# Patient Record
Sex: MALE | Race: BLACK OR AFRICAN AMERICAN
[De-identification: names, ages, dates, MRNs, and addresses within clinical notes are randomized per-mention and may not be internally consistent; named-entity substitution may affect disease eponyms.]

---

## 2017-10-09 ENCOUNTER — HOSPITAL ENCOUNTER (EMERGENCY)
Dept: HOSPITAL 92 - ERS | Age: 52
Discharge: HOME | End: 2017-10-09
Payer: COMMERCIAL

## 2017-10-09 DIAGNOSIS — Z79.82: ICD-10-CM

## 2017-10-09 DIAGNOSIS — Z86.73: ICD-10-CM

## 2017-10-09 DIAGNOSIS — F19.10: ICD-10-CM

## 2017-10-09 DIAGNOSIS — I10: ICD-10-CM

## 2017-10-09 DIAGNOSIS — S09.90XA: Primary | ICD-10-CM

## 2017-10-09 DIAGNOSIS — E11.9: ICD-10-CM

## 2017-10-09 DIAGNOSIS — Z79.899: ICD-10-CM

## 2017-10-09 DIAGNOSIS — R56.9: ICD-10-CM

## 2017-10-09 DIAGNOSIS — Z87.891: ICD-10-CM

## 2017-10-09 DIAGNOSIS — Z79.84: ICD-10-CM

## 2017-10-09 LAB
ALP SERPL-CCNC: 76 U/L (ref 40–150)
ALT SERPL W P-5'-P-CCNC: 12 U/L (ref 8–55)
ANION GAP SERPL CALC-SCNC: 17 MMOL/L (ref 10–20)
AST SERPL-CCNC: 15 U/L (ref 5–34)
BASOPHILS # BLD AUTO: 0 THOU/UL (ref 0–0.2)
BASOPHILS NFR BLD AUTO: 0.2 % (ref 0–1)
BILIRUB SERPL-MCNC: 0.4 MG/DL (ref 0.2–1.2)
BUN SERPL-MCNC: 16 MG/DL (ref 8.4–25.7)
CALCIUM SERPL-MCNC: 9.5 MG/DL (ref 7.8–10.44)
CHLORIDE SERPL-SCNC: 102 MMOL/L (ref 98–107)
CK SERPL-CCNC: 260 U/L (ref 30–200)
CO2 SERPL-SCNC: 21 MMOL/L (ref 22–29)
CREAT CL PREDICTED SERPL C-G-VRATE: 0 ML/MIN (ref 70–130)
EOSINOPHIL # BLD AUTO: 0.4 THOU/UL (ref 0–0.7)
EOSINOPHIL NFR BLD AUTO: 4.5 % (ref 0–10)
GLOBULIN SER CALC-MCNC: 3.6 G/DL (ref 2.4–3.5)
HCT VFR BLD CALC: 43.1 % (ref 42–52)
LYMPHOCYTES # BLD: 1.4 THOU/UL (ref 1.2–3.4)
LYMPHOCYTES NFR BLD AUTO: 15.5 % (ref 21–51)
MONOCYTES # BLD AUTO: 0.6 THOU/UL (ref 0.11–0.59)
MONOCYTES NFR BLD AUTO: 6.1 % (ref 0–10)
NEUTROPHILS # BLD AUTO: 6.7 THOU/UL (ref 1.4–6.5)
RBC # BLD AUTO: 4.69 MILL/UL (ref 4.7–6.1)
WBC # BLD AUTO: 9.1 THOU/UL (ref 4.8–10.8)

## 2017-10-09 PROCEDURE — 80053 COMPREHEN METABOLIC PANEL: CPT

## 2017-10-09 PROCEDURE — 82550 ASSAY OF CK (CPK): CPT

## 2017-10-09 PROCEDURE — 36415 COLL VENOUS BLD VENIPUNCTURE: CPT

## 2017-10-09 PROCEDURE — 93005 ELECTROCARDIOGRAM TRACING: CPT

## 2017-10-09 PROCEDURE — 70450 CT HEAD/BRAIN W/O DYE: CPT

## 2017-10-09 PROCEDURE — 96360 HYDRATION IV INFUSION INIT: CPT

## 2017-10-09 PROCEDURE — 72125 CT NECK SPINE W/O DYE: CPT

## 2017-10-09 PROCEDURE — 85025 COMPLETE CBC W/AUTO DIFF WBC: CPT

## 2017-10-09 PROCEDURE — 96361 HYDRATE IV INFUSION ADD-ON: CPT

## 2017-10-09 NOTE — CT
CT CERVICAL SPINE WITHOUT CONTRAST:

 

Date:  10/09/17 

 

HISTORY:  

Trauma. Seizure. 

 

COMPARISON:  

None. 

 

FINDINGS:

No acute fracture or malalignment. Evidence of old injury of posterior spinous process at C2 on the 
left with some atrophy of the insertional musculature. No paraspinal hematoma at this level. 

 

Moderate plaque at both carotid bulbs. There is a faint opacity in the right upper lobe. 

 

Foramen magnum is intact. Odontoid process is intact. Mild uncinate process hypertrophy C2-3 and C4-
5. There is anterior disc osteophyte complex C5-6. 

 

IMPRESSION: 

1.  No acute fracture or malalignment. 

 

2.  Evidence of old injury to the spinous process of C2 with atrophy of the insertional musculature.
 No adjacent hematoma to suggest acuity. Recommend correlation with focal tenderness. 

 

 

 

POS: [facility]

## 2017-10-09 NOTE — CT
CT OF THE BRAIN WITHOUT CONTRAST:

 

Date:  10/09/17 

 

INDICATION:

History of seizures. 

 

COMPARISON:  

Prior exam dated 07/17/15. 

 

FINDINGS:

There are evolutionary changes involving the large left parietal temporal infarct seen on the compar
tony exam. There is a remote-appearing lacunar infarct involving the anterior right thalamus. No def
inite acute infarct, hemorrhage, or hydrocephalus is present. The septum pellucidum and third ventri
moses are midline. Skull and extracranial soft tissues appear within normal limits. 

 

IMPRESSION: 

1.  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 

 

2.  Interval remote-appearing infarct involving the anterior right thalamus.

 

3.  No definite acute infarct, hemorrhage, or hydrocephalus is present. 

 

 

POS: [facility]

## 2018-07-10 ENCOUNTER — HOSPITAL ENCOUNTER (EMERGENCY)
Dept: HOSPITAL 92 - ERS | Age: 53
Discharge: HOME | End: 2018-07-10
Payer: COMMERCIAL

## 2018-07-10 DIAGNOSIS — Z79.84: ICD-10-CM

## 2018-07-10 DIAGNOSIS — Z87.891: ICD-10-CM

## 2018-07-10 DIAGNOSIS — E11.9: ICD-10-CM

## 2018-07-10 DIAGNOSIS — R55: Primary | ICD-10-CM

## 2018-07-10 DIAGNOSIS — Z79.899: ICD-10-CM

## 2018-07-10 DIAGNOSIS — Z86.73: ICD-10-CM

## 2018-07-10 DIAGNOSIS — I10: ICD-10-CM

## 2018-07-10 DIAGNOSIS — Z79.82: ICD-10-CM

## 2018-07-10 LAB
ALBUMIN SERPL BCG-MCNC: 3.7 G/DL (ref 3.5–5)
ALP SERPL-CCNC: 76 U/L (ref 40–150)
ALT SERPL W P-5'-P-CCNC: 13 U/L (ref 8–55)
ANION GAP SERPL CALC-SCNC: 16 MMOL/L (ref 10–20)
APAP SERPL-MCNC: (no result) MCG/ML (ref 10–30)
AST SERPL-CCNC: 12 U/L (ref 5–34)
BASOPHILS # BLD AUTO: 0.1 THOU/UL (ref 0–0.2)
BASOPHILS NFR BLD AUTO: 1.2 % (ref 0–1)
BILIRUB SERPL-MCNC: 0.2 MG/DL (ref 0.2–1.2)
BUN SERPL-MCNC: 16 MG/DL (ref 8.4–25.7)
CALCIUM SERPL-MCNC: 8.6 MG/DL (ref 7.8–10.44)
CHLORIDE SERPL-SCNC: 107 MMOL/L (ref 98–107)
CK MB SERPL-MCNC: 3.6 NG/ML (ref 0–6.6)
CO2 SERPL-SCNC: 18 MMOL/L (ref 22–29)
CREAT CL PREDICTED SERPL C-G-VRATE: 0 ML/MIN (ref 70–130)
CRYSTAL-AUWI FLAG: 0.1 (ref 0–15)
DRUG SCREEN CUTOFF: (no result)
EOSINOPHIL # BLD AUTO: 0.3 THOU/UL (ref 0–0.7)
EOSINOPHIL NFR BLD AUTO: 3.5 % (ref 0–10)
GLOBULIN SER CALC-MCNC: 3 G/DL (ref 2.4–3.5)
GLUCOSE SERPL-MCNC: 194 MG/DL (ref 70–105)
GLUCOSE UR STRIP-MCNC: 250 MG/DL
HEV IGM SER QL: 1 (ref 0–7.99)
HGB BLD-MCNC: 12.2 G/DL (ref 14–18)
HYALINE CASTS #/AREA URNS LPF: (no result) LPF
LAVENDER: (no result)
LYMPHOCYTES # BLD: 1.3 THOU/UL (ref 1.2–3.4)
LYMPHOCYTES NFR BLD AUTO: 17.6 % (ref 21–51)
MCH RBC QN AUTO: 29 PG (ref 27–31)
MCV RBC AUTO: 88.3 FL (ref 78–98)
MEDTOX CONTROL LINE VALID?: (no result)
MEDTOX READER #: (no result)
MONOCYTES # BLD AUTO: 0.6 THOU/UL (ref 0.11–0.59)
MONOCYTES NFR BLD AUTO: 7.6 % (ref 0–10)
NEUTROPHILS # BLD AUTO: 5.1 THOU/UL (ref 1.4–6.5)
NEUTROPHILS NFR BLD AUTO: 70.2 % (ref 42–75)
PATHC CAST-AUWI FLAG: 0 (ref 0–2.49)
PLATELET # BLD AUTO: 276 THOU/UL (ref 130–400)
POTASSIUM SERPL-SCNC: 4.6 MMOL/L (ref 3.5–5.1)
RBC # BLD AUTO: 4.19 MILL/UL (ref 4.7–6.1)
RBC UR QL AUTO: (no result) HPF (ref 0–3)
RED: (no result)
SALICYLATES SERPL-MCNC: (no result) MG/DL (ref 15–30)
SODIUM SERPL-SCNC: 136 MMOL/L (ref 136–145)
SP GR UR STRIP: 1.01 (ref 1–1.04)
SPERM-AUWI FLAG: 0 (ref 0–9.9)
TROPONIN I SERPL DL<=0.01 NG/ML-MCNC: (no result) NG/ML (ref ?–0.03)
WBC # BLD AUTO: 7.3 THOU/UL (ref 4.8–10.8)
YEAST-AUWI FLAG: 0 (ref 0–25)

## 2018-07-10 PROCEDURE — 81015 MICROSCOPIC EXAM OF URINE: CPT

## 2018-07-10 PROCEDURE — 84484 ASSAY OF TROPONIN QUANT: CPT

## 2018-07-10 PROCEDURE — 82553 CREATINE MB FRACTION: CPT

## 2018-07-10 PROCEDURE — 72125 CT NECK SPINE W/O DYE: CPT

## 2018-07-10 PROCEDURE — 93005 ELECTROCARDIOGRAM TRACING: CPT

## 2018-07-10 PROCEDURE — 80306 DRUG TEST PRSMV INSTRMNT: CPT

## 2018-07-10 PROCEDURE — 85025 COMPLETE CBC W/AUTO DIFF WBC: CPT

## 2018-07-10 PROCEDURE — 80053 COMPREHEN METABOLIC PANEL: CPT

## 2018-07-10 PROCEDURE — 70450 CT HEAD/BRAIN W/O DYE: CPT

## 2018-07-10 PROCEDURE — 36415 COLL VENOUS BLD VENIPUNCTURE: CPT

## 2018-07-10 PROCEDURE — 96360 HYDRATION IV INFUSION INIT: CPT

## 2018-07-10 PROCEDURE — 81003 URINALYSIS AUTO W/O SCOPE: CPT

## 2018-07-10 PROCEDURE — 84146 ASSAY OF PROLACTIN: CPT

## 2018-07-10 PROCEDURE — 80307 DRUG TEST PRSMV CHEM ANLYZR: CPT

## 2018-07-10 NOTE — CT
PRELIMINARY REPORT/[facility]/EMERGENTY AFTER-HOURS PROCEDURE 

 

CT Cervical Spine Without Intravenous Contrast

 

CLINICAL HISTORY:

53 years old, male; Injury or trauma; Fall; Initial encounter; Abrasion; Patient HX: 52 yo m w/compla
int of seizure vs syncope. He was found down at home by his mother at apprx 1130 pm on 7/9. PT does n
ot remember the event other than "feeling strange. " there is nobody available who witnessed the even
t. He has a HX of 2 or 3 similar events in the past. Fall

 

TECHNIQUE:

Axial computed tomography images of the cervical spine without intravenous contrast.

Coronal and sagittal reformatted images were created and reviewed.

 

COMPARISON:

No relevant prior studies available.

 

FINDINGS:

Vertebrae: No evidence of acute fracture.

Discs/Spinal canal/Neural foramina: No spinal stenosis. No neural foraminal narrowing.

Soft tissues: Unremarkable.

Lung apices: Normal.

Other findings: No evidence of malalignment.

 

IMPRESSION:

1. No evidence of acute fracture.

2. No evidence of malalignment.

 

Thank you for allowing us to participate in the care of your patient.

Dictated and Authenticated by: Leidy Manuel MD

07/10/2018 2:48 AM Central Time ([place] & [place])

 

 

CERVICAL SPINE CT NONCONTRAST:

 

Date:  07/10/18 

 

INDICATION:

Fall with neck injury and pain. 

 

FINDINGS:

There is mild degenerative change of the cervical spine without evidence of compression fracture or s
ubluxation. There is dystrophic ligamentous calcification posteriorly. Craniocervical junction is int
act. 

 

IMPRESSION: 

No acute osseous abnormality of the cervical spine. 

 

 

POS: [facility]

## 2018-07-10 NOTE — CT
PRELIMINARY REPORT/[facility]/EMERGENTY AFTER-HOURS PROCEDURE 

 

CT Head Without Intravenous Contrast

 

CLINICAL HISTORY:

53 years old, male; Injury or trauma; Fall; Initial encounter; Blunt trauma (contusions or hematomas)
; Consciousness not specified; Patient HX: 54 yo m w/complaint of seizure vs syncope. He was found do
wn at home by his mother at apprx 1130 pm on 7/9. PT does not remember the event other than

"feeling strange. " there is nobody available who witnessed the event. He has a HX of 2 or 3 similar 
events in the past.

 

TECHNIQUE:

Axial computed tomography images of the head/brain without intravenous contrast.

 

COMPARISON:

No relevant prior studies available.

 

FINDINGS:

Brain: No evidence of acute intracranial hemorrhage, extraxial fluid or midline shift. Focal encephal
omalacia involving left temporal-parietal lobes. Prominent CSF space at basilar cisterns.

Ventricles: Mild prominence of the cerebral sulci and ventricles.

Bones/joints: Normal. No acute fracture.

Sinuses: Minimal left maxillary sinus fluid/soft tissue.

Mastoid air cells: Normal as visualized. No mastoid effusion.

Soft tissues: Normal.

 

IMPRESSION:

1. No evidence of acute intracranial hemorrhage, extraxial fluid or midline shift.

2. Mild cerebral atrophy.

3. Minimal left maxillary sinus fluid/soft tissue.

 

Thank you for allowing us to participate in the care of your patient.

Dictated and Authenticated by: Leidy Manuel MD

07/10/2018 2:46 AM Central Time ([place] & [place])

 

 

FINAL REPORT 

CT BRAIN WITHOUT CONTRAST:

 

Date:  07/10/18 

 

FINDINGS/IMPRESSION: 

I agree with the preliminary report given by Faustina. 

 

 

POS: OFF

## 2018-08-10 ENCOUNTER — HOSPITAL ENCOUNTER (EMERGENCY)
Dept: HOSPITAL 92 - ERS | Age: 53
Discharge: HOME | End: 2018-08-10
Payer: COMMERCIAL

## 2018-08-10 DIAGNOSIS — Z79.82: ICD-10-CM

## 2018-08-10 DIAGNOSIS — Z79.84: ICD-10-CM

## 2018-08-10 DIAGNOSIS — I10: ICD-10-CM

## 2018-08-10 DIAGNOSIS — Z79.899: ICD-10-CM

## 2018-08-10 DIAGNOSIS — R56.9: Primary | ICD-10-CM

## 2018-08-10 DIAGNOSIS — Z87.891: ICD-10-CM

## 2018-08-10 DIAGNOSIS — Z86.73: ICD-10-CM

## 2018-08-10 DIAGNOSIS — E11.9: ICD-10-CM

## 2018-08-10 LAB
ALBUMIN SERPL BCG-MCNC: 3.9 G/DL (ref 3.5–5)
ALP SERPL-CCNC: 92 U/L (ref 40–150)
ALT SERPL W P-5'-P-CCNC: 11 U/L (ref 8–55)
ANION GAP SERPL CALC-SCNC: 15 MMOL/L (ref 10–20)
AST SERPL-CCNC: 10 U/L (ref 5–34)
BASOPHILS # BLD AUTO: 0 THOU/UL (ref 0–0.2)
BASOPHILS NFR BLD AUTO: 0.1 % (ref 0–1)
BILIRUB SERPL-MCNC: 0.2 MG/DL (ref 0.2–1.2)
BUN SERPL-MCNC: 14 MG/DL (ref 8.4–25.7)
CALCIUM SERPL-MCNC: 9.3 MG/DL (ref 7.8–10.44)
CHLORIDE SERPL-SCNC: 103 MMOL/L (ref 98–107)
CK MB SERPL-MCNC: 2.4 NG/ML (ref 0–6.6)
CO2 SERPL-SCNC: 21 MMOL/L (ref 22–29)
CREAT CL PREDICTED SERPL C-G-VRATE: 0 ML/MIN (ref 70–130)
CRYSTAL-AUWI FLAG: 0 (ref 0–15)
DRUG SCREEN CUTOFF: (no result)
EOSINOPHIL # BLD AUTO: 0.4 THOU/UL (ref 0–0.7)
EOSINOPHIL NFR BLD AUTO: 4.4 % (ref 0–10)
GLOBULIN SER CALC-MCNC: 3.2 G/DL (ref 2.4–3.5)
GLUCOSE SERPL-MCNC: 173 MG/DL (ref 70–105)
GLUCOSE UR STRIP-MCNC: 250 MG/DL
HEV IGM SER QL: 0.7 (ref 0–7.99)
HGB BLD-MCNC: 13.2 G/DL (ref 14–18)
HYALINE CASTS #/AREA URNS LPF: (no result) LPF
LYMPHOCYTES # BLD: 1.3 THOU/UL (ref 1.2–3.4)
LYMPHOCYTES NFR BLD AUTO: 15.2 % (ref 21–51)
MCH RBC QN AUTO: 29.2 PG (ref 27–31)
MCV RBC AUTO: 88.7 FL (ref 78–98)
MEDTOX CONTROL LINE VALID?: (no result)
MEDTOX READER #: (no result)
MONOCYTES # BLD AUTO: 0.6 THOU/UL (ref 0.11–0.59)
MONOCYTES NFR BLD AUTO: 7.2 % (ref 0–10)
NEUTROPHILS # BLD AUTO: 6.1 THOU/UL (ref 1.4–6.5)
NEUTROPHILS NFR BLD AUTO: 73.1 % (ref 42–75)
PATHC CAST-AUWI FLAG: 0.14 (ref 0–2.49)
PLATELET # BLD AUTO: 283 THOU/UL (ref 130–400)
POTASSIUM SERPL-SCNC: 4.6 MMOL/L (ref 3.5–5.1)
RBC # BLD AUTO: 4.5 MILL/UL (ref 4.7–6.1)
RBC UR QL AUTO: (no result) HPF (ref 0–3)
SODIUM SERPL-SCNC: 134 MMOL/L (ref 136–145)
SP GR UR STRIP: 1.01 (ref 1–1.04)
SPERM-AUWI FLAG: 0 (ref 0–9.9)
TROPONIN I SERPL DL<=0.01 NG/ML-MCNC: (no result) NG/ML (ref ?–0.03)
WBC # BLD AUTO: 8.3 THOU/UL (ref 4.8–10.8)
YEAST-AUWI FLAG: 0 (ref 0–25)

## 2018-08-10 PROCEDURE — 93005 ELECTROCARDIOGRAM TRACING: CPT

## 2018-08-10 PROCEDURE — 81003 URINALYSIS AUTO W/O SCOPE: CPT

## 2018-08-10 PROCEDURE — 36415 COLL VENOUS BLD VENIPUNCTURE: CPT

## 2018-08-10 PROCEDURE — 82553 CREATINE MB FRACTION: CPT

## 2018-08-10 PROCEDURE — 70450 CT HEAD/BRAIN W/O DYE: CPT

## 2018-08-10 PROCEDURE — 81015 MICROSCOPIC EXAM OF URINE: CPT

## 2018-08-10 PROCEDURE — 84484 ASSAY OF TROPONIN QUANT: CPT

## 2018-08-10 PROCEDURE — 80053 COMPREHEN METABOLIC PANEL: CPT

## 2018-08-10 PROCEDURE — 85025 COMPLETE CBC W/AUTO DIFF WBC: CPT

## 2018-08-10 PROCEDURE — 80306 DRUG TEST PRSMV INSTRMNT: CPT

## 2018-08-10 NOTE — CT
HEAD CT WITHOUT CONTRAST:

8/10/18

 

COMPARISON:  

7/10/18

 

HISTORY: 

Nausea, seizures, fainted.

 

TECHNIQUE:  

Serial axial CT imaging  at 5 mm intervals from vertex through skull base without contrast.

 

FINDINGS:  

The imaged paranasal sinuses/mastoid air cells are well aerated. There is no displaced calvarial frac
ture.

 

There is mild cerebral volume loss with prominence of the CSF containing spaces, stable. there is enc
ephalomalacia within the posterior aspect of the MCA territory on the left consistent with a prior le
ft temporal/frontoparietal MCA infarction. When compared to the prior examination, there has been no 
significant interval change. no acute osseous abnormality noted.

 

IMPRESSION:  

Stable head CT as detailed above. 

 

POS: [facility]